# Patient Record
Sex: MALE | Race: WHITE | URBAN - METROPOLITAN AREA
[De-identification: names, ages, dates, MRNs, and addresses within clinical notes are randomized per-mention and may not be internally consistent; named-entity substitution may affect disease eponyms.]

---

## 2023-06-14 ENCOUNTER — OFFICE VISIT (OUTPATIENT)
Facility: LOCATION | Age: 48
End: 2023-06-14
Payer: COMMERCIAL

## 2023-06-14 DIAGNOSIS — H04.123 TEAR FILM INSUFFICIENCY OF BILATERAL LACRIMAL GLANDS: ICD-10-CM

## 2023-06-14 DIAGNOSIS — H43.393 OTHER VITREOUS OPACITIES, BILATERAL: Primary | ICD-10-CM

## 2023-06-14 DIAGNOSIS — H52.13 MYOPIA, BILATERAL: ICD-10-CM

## 2023-06-14 PROCEDURE — 92004 COMPRE OPH EXAM NEW PT 1/>: CPT | Performed by: OPTOMETRIST

## 2023-06-14 ASSESSMENT — VISUAL ACUITY
OS: 20/20
OD: 20/20

## 2023-06-14 ASSESSMENT — INTRAOCULAR PRESSURE
OD: 16
OS: 16

## 2023-06-14 ASSESSMENT — KERATOMETRY
OS: 44.13
OD: 44.00

## 2023-06-14 NOTE — IMPRESSION/PLAN
Impression: Other vitreous opacities, bilateral: H43.393. Plan: Floaters OU accounts for the patient's complaints. Optos performed and evaluated at today's examination- Unremarkable OU. There is no evidence of retinal pathology. All signs and risks of retinal detachment and tears were discussed in detail. Patient instructed to call the office immediately if any symptoms noted.

## 2023-06-14 NOTE — IMPRESSION/PLAN
Impression: Myopia, bilateral: H52.13. Plan: Patient has a Hx of Lasik OU 20+ years ago. Patient's vision does improve with a glasses prescription. Discussed findings in detail with patient. Patient would like to discuss repeat LASIK w/ specialist. Will refer patient to corneal specialist for further evaluation and treatment. RTC: Next available corneal evaluation with Dr. Heide Colunga.

## 2023-07-21 ENCOUNTER — REFRACTIVE (OUTPATIENT)
Dept: URBAN - METROPOLITAN AREA CLINIC 33 | Facility: CLINIC | Age: 48
End: 2023-07-21
Payer: COMMERCIAL

## 2023-07-21 DIAGNOSIS — H52.13 MYOPIA, BILATERAL: Primary | ICD-10-CM

## 2023-07-21 ASSESSMENT — VISUAL ACUITY
OS: 20/20
OD: 20/20

## 2023-07-21 ASSESSMENT — KERATOMETRY
OS: 44.00
OD: 43.88